# Patient Record
Sex: MALE | Race: WHITE
[De-identification: names, ages, dates, MRNs, and addresses within clinical notes are randomized per-mention and may not be internally consistent; named-entity substitution may affect disease eponyms.]

---

## 2017-07-31 ENCOUNTER — HOSPITAL ENCOUNTER (EMERGENCY)
Dept: HOSPITAL 45 - C.EDB | Age: 37
Discharge: HOME | End: 2017-07-31
Payer: COMMERCIAL

## 2017-07-31 VITALS — SYSTOLIC BLOOD PRESSURE: 145 MMHG | HEART RATE: 72 BPM | OXYGEN SATURATION: 98 % | DIASTOLIC BLOOD PRESSURE: 72 MMHG

## 2017-07-31 VITALS — TEMPERATURE: 98.78 F

## 2017-07-31 VITALS
BODY MASS INDEX: 40.04 KG/M2 | WEIGHT: 249.12 LBS | BODY MASS INDEX: 40.04 KG/M2 | HEIGHT: 65.98 IN | HEIGHT: 65.98 IN | WEIGHT: 249.12 LBS

## 2017-07-31 VITALS — OXYGEN SATURATION: 96 %

## 2017-07-31 DIAGNOSIS — F41.9: Primary | ICD-10-CM

## 2017-07-31 DIAGNOSIS — H81.09: ICD-10-CM

## 2017-07-31 DIAGNOSIS — F17.200: ICD-10-CM

## 2017-07-31 DIAGNOSIS — R42: ICD-10-CM

## 2017-07-31 DIAGNOSIS — F32.9: ICD-10-CM

## 2017-07-31 LAB
ALBUMIN/GLOB SERPL: 1.2 {RATIO} (ref 0.9–2)
ALP SERPL-CCNC: 51 U/L (ref 45–117)
ALT SERPL-CCNC: 29 U/L (ref 12–78)
ANION GAP SERPL CALC-SCNC: 6 MMOL/L (ref 3–11)
APAP SERPL-MCNC: < 2 UG/ML (ref 10–30)
APPEARANCE UR: CLEAR
AST SERPL-CCNC: 7 U/L (ref 15–37)
BASOPHILS # BLD: 0.03 K/UL (ref 0–0.2)
BASOPHILS NFR BLD: 0.4 %
BENZODIAZ UR-MCNC: (no result) UG/L
BILIRUB UR-MCNC: (no result) MG/DL
BUN SERPL-MCNC: 16 MG/DL (ref 7–18)
BUN/CREAT SERPL: 18.7 (ref 10–20)
CALCIUM SERPL-MCNC: 9 MG/DL (ref 8.5–10.1)
CHLORIDE SERPL-SCNC: 109 MMOL/L (ref 98–107)
CO2 SERPL-SCNC: 28 MMOL/L (ref 21–32)
COLOR UR: YELLOW
COMPLETE: YES
CREAT CL PREDICTED SERPL C-G-VRATE: 141.2 ML/MIN
CREAT SERPL-MCNC: 0.87 MG/DL (ref 0.6–1.4)
EOSINOPHIL NFR BLD AUTO: 204 K/UL (ref 130–400)
GLOBULIN SER-MCNC: 3.3 GM/DL (ref 2.5–4)
GLUCOSE SERPL-MCNC: 99 MG/DL (ref 70–99)
HCT VFR BLD CALC: 44.1 % (ref 42–52)
IG%: 0.3 %
IMM GRANULOCYTES NFR BLD AUTO: 25.3 %
LYMPHOCYTES # BLD: 1.76 K/UL (ref 1.2–3.4)
MANUAL MICROSCOPIC REQUIRED?: NO
MCH RBC QN AUTO: 29.2 PG (ref 25–34)
MCHC RBC AUTO-ENTMCNC: 34.2 G/DL (ref 32–36)
MCV RBC AUTO: 85.1 FL (ref 80–100)
MONOCYTES NFR BLD: 6.7 %
NEUTROPHILS # BLD AUTO: 1.9 %
NEUTROPHILS NFR BLD AUTO: 65.4 %
NITRITE UR QL STRIP: (no result)
PCP UR-MCNC: (no result) UG/L
PH UR STRIP: 7 [PH] (ref 4.5–7.5)
PMV BLD AUTO: 10.7 FL (ref 7.4–10.4)
POTASSIUM SERPL-SCNC: 4 MMOL/L (ref 3.5–5.1)
RBC # BLD AUTO: 5.18 M/UL (ref 4.7–6.1)
REVIEW REQ?: NO
SODIUM SERPL-SCNC: 143 MMOL/L (ref 136–145)
SP GR UR STRIP: 1.02 (ref 1–1.03)
TSH SERPL-ACNC: 0.52 UIU/ML (ref 0.3–4.5)
URINE BILL WITH OR WITHOUT MIC: (no result)
URINE EPITHELIAL CELL AUTO: (no result) /LPF (ref 0–5)
UROBILINOGEN UR-MCNC: (no result) MG/DL
WBC # BLD AUTO: 6.97 K/UL (ref 4.8–10.8)

## 2017-07-31 NOTE — EMERGENCY ROOM VISIT NOTE
History


First contact with patient:  13:32


Chief Complaint:  DIZZY


Stated Complaint:  DIZZY


Nursing Triage Summary:  


hx right vestibular nerve section and a shunt and vertigo





pt c/o dizziness today





History of Present Illness


Patient is a 36-year-old white male with past medical history significant for M

nire's disease, neurofibromatosis, chronic vertigo, status post resection of 

his right vestibular nerve, who is brought to the emergency department today by 

a coworker for evaluation of spells of vertigo today.  Patient reports that he 

has been under increased stress over the last week due to the death of a friend 

from overdose.  The friend's sister also apparently tried to harm herself.  He 

has been trying to be supportive for her, and has had difficulty dealing with 

the sudden death of his friend.  He also reports that he is having difficulty 

coping with his chronic medical condition.  He has good and bad days with 

regards to his dizziness and vertigo.  He is followed by neurologist from 

[place], and reports that he has been appointment in September.  He has 

symptoms on a daily basis, some days are better than others.  Today, he feels 

like they are worsened by his stress and anxiety.  He presently describes his 

symptoms as feeling like he is "on a Tilt a Whirl" with associated nausea.  He 

has not taken any medication for his symptoms today.  He reports "I feel like I'

m going insane."  He was apparently at work, where he states he had a 

"breakdown."  His coworker wanted to take him home, but the patient did not 

feel like he could go home, and tried to go to his [employer] to speak to his 

.  When the  was not available, the coworker suggested coming to 

the emergency department.  The patient states "I just want someone to talk to."

  The patient has a remote history of depression when he was roughly 16 after 

the sudden passing of his father.  He did see a therapist and was on 

antidepressants for some time.  He does endorse symptoms consistent with anxiety

, but is not presently on any medications for this.  He has a lot of issues 

coping with his chronic medical condition.  He denies feeling suicidal or 

homicidal at this time.





Review of Systems


Review of systems as per HPI.  All other systems reviewed were negative.  10 

systems reviewed.





Past Medical/Surgical History


Medical Problems:


(1) Anxiety


(2) Depressive Disorder Nec


(3) Fall


(4) Meniere disease


(5) Neurofibromatosis,Type 1 Von Recklinghausen's


(6) Tinnitus Nos


(7) Tinnitus Nos


(8) Tinnitus Nos


(9) Unspecified tinnitus


(10) Vertigo


(11) Vertigo


(12) Vertigo


Electronic medical records are reviewed and summarized as above/below.  See 

Problem List.





Social History


Smoking Status:  Current Every Day Smoker


Alcohol Use:  none


Drug Use:  none


Marital Status:  single


Housing Status:  lives with family


Occupation Status:  employed





Current/Historical Medications


Scheduled


Betahistine Hydrochloride (Bul (Betahistine Dihydrochlori), Unknown Dose PO AS 

DIRECTED





Allergies


Coded Allergies:  


     Dimenhydrinate (Verified  Allergy, Severe, VESTIBULAR SX, 8/12/16)


 INSTRUCTED NEVER TO TAKE


     Nabumetone (Verified  Allergy, Unknown, RASH, 8/12/16)





Physical Exam


Vital Signs











  Date Time  Temp Pulse Resp B/P (MAP) Pulse Ox O2 Delivery O2 Flow Rate FiO2


 


7/31/17 17:15  72 16 145/72 98 Room Air  


 


7/31/17 15:17  76  153/89 98 Room Air  





  81  142/88    





  85  155/94    


 


7/31/17 15:17  76 16 153/89 98 Room Air  


 


7/31/17 13:38  92      


 


7/31/17 13:34     96 Room Air  


 


7/31/17 13:11 37.1 93 16 153/85 96 Room Air  











Physical Exam


CONSTITUTIONAL: Patient is a well-appearing 36-year-old white male who is awake 

and alert and in no acute distress.  His vital signs are stable.


HEENT: Normocephalic, atraumatic.  Pupils equal, round, reactive to light and 

accommodation.  EOMs intact without nystagmus.  Sclera are anicteric. Tympanic 

membranes intact, with normal landmarks.  External canals are clear.  No 

hemotympanum or Medina sign.  Oral and nasopharynx are clear.  No CSF 

rhinorrhea. Mucous membranes are moist. 


NECK: Supple, nontender, no lymphadenopathy.  Full range of motion.


HEART: Regular rate and rhythm, with normal S1 and S2, no murmur or gallop or 

rub is heard.


LUNGS: Breath sounds equal and clear to auscultation without wheezes, rales, or 

rhonchi heard. 


ABDOMEN: Bowel sounds are present.  Abdomen is soft, nontender and 

nondistended.  


SKIN: No lesions or rash, normal skin turgor.


EXTREMITIES: No cyanosis, edema, joint tenderness or swelling.  No deformity.


NEUROLOGICAL: Alert and oriented x4.  Cranial nerves 2 through 12, sensation 

and strength grossly intact.  Gait is normal. Finger to nose is intact.  

Immediate, recent and remote memories are intact.  Concentration is normal.  


PSYCHIATRIC: Patient is alert and oriented.  Flat affect, voice is muted and 

mumbles at times.  Eye contact is appropriate.  Answers questions 

appropriately.  No homicidal or suicidal thoughts or ideation.





Medical Decision & Procedures


Laboratory Results


7/31/17 15:00








Red Blood Count 5.18, Mean Corpuscular Volume 85.1, Mean Corpuscular Hemoglobin 

29.2, Mean Corpuscular Hemoglobin Concent 34.2, Mean Platelet Volume 10.7, 

Neutrophils (%) (Auto) 65.4, Lymphocytes (%) (Auto) 25.3, Monocytes (%) (Auto) 

6.7, Eosinophils (%) (Auto) 1.9, Basophils (%) (Auto) 0.4, Neutrophils # (Auto) 

4.56, Lymphocytes # (Auto) 1.76, Monocytes # (Auto) 0.47, Eosinophils # (Auto) 

0.13, Basophils # (Auto) 0.03





7/31/17 15:00

















Test


  7/31/17


14:30 7/31/17


15:00 7/31/17


15:20


 


Urine Color YELLOW   


 


Urine Appearance CLEAR (CLEAR)   


 


Urine pH 7.0 (4.5-7.5)   


 


Urine Specific Gravity


  1.022


(1.000-1.030) 


  


 


 


Urine Protein 1+ (NEG)   


 


Urine Glucose (UA) NEG (NEG)   


 


Urine Ketones TRACE (NEG)   


 


Urine Occult Blood TRACE (NEG)   


 


Urine Nitrite NEG (NEG)   


 


Urine Bilirubin NEG (NEG)   


 


Urine Urobilinogen NEG (NEG)   


 


Urine Leukocyte Esterase NEG (NEG)   


 


Urine WBC (Auto) 0 /hpf (0-5)   


 


Urine RBC (Auto)


  5-10 /hpf


(0-4) 


  


 


 


Urine Hyaline Casts (Auto) 1-5 /lpf (0-5)   


 


Urine Epithelial Cells (Auto) 0-5 /lpf (0-5)   


 


Urine Bacteria (Auto) NEG (NEG)   


 


Urine Opiates Screen NEG (NEG)   


 


Urine Methadone, Qualitative NEG (NEG)   


 


Urine Barbiturates NEG (NEG)   


 


Urine Phencyclidine (PCP)


Level NEG (NEG) 


  


  


 


 


Ur


Amphetamine/Methamphetamine NEG (NEG) 


  


  


 


 


MDMA (Ecstasy) Screen NEG (NEG)   


 


Urine Benzodiazepines Screen NEG (NEG)   


 


Urine Cocaine Metabolite NEG (NEG)   


 


Urine Marijuana (THC) POS (NEG)   


 


White Blood Count


  


  6.97 K/uL


(4.8-10.8) 


 


 


Red Blood Count


  


  5.18 M/uL


(4.7-6.1) 


 


 


Hemoglobin


  


  15.1 g/dL


(14.0-18.0) 


 


 


Hematocrit  44.1 % (42-52)  


 


Mean Corpuscular Volume


  


  85.1 fL


() 


 


 


Mean Corpuscular Hemoglobin


  


  29.2 pg


(25-34) 


 


 


Mean Corpuscular Hemoglobin


Concent 


  34.2 g/dl


(32-36) 


 


 


Platelet Count


  


  204 K/uL


(130-400) 


 


 


Mean Platelet Volume


  


  10.7 fL


(7.4-10.4) 


 


 


Neutrophils (%) (Auto)  65.4 %  


 


Lymphocytes (%) (Auto)  25.3 %  


 


Monocytes (%) (Auto)  6.7 %  


 


Eosinophils (%) (Auto)  1.9 %  


 


Basophils (%) (Auto)  0.4 %  


 


Neutrophils # (Auto)


  


  4.56 K/uL


(1.4-6.5) 


 


 


Lymphocytes # (Auto)


  


  1.76 K/uL


(1.2-3.4) 


 


 


Monocytes # (Auto)


  


  0.47 K/uL


(0.11-0.59) 


 


 


Eosinophils # (Auto)


  


  0.13 K/uL


(0-0.5) 


 


 


Basophils # (Auto)


  


  0.03 K/uL


(0-0.2) 


 


 


RDW Standard Deviation


  


  39.5 fL


(36.4-46.3) 


 


 


RDW Coefficient of Variation


  


  12.7 %


(11.5-14.5) 


 


 


Immature Granulocyte % (Auto)  0.3 %  


 


Immature Granulocyte # (Auto)


  


  0.02 K/uL


(0.00-0.02) 


 


 


Anion Gap


  


  6.0 mmol/L


(3-11) 


 


 


Est Creatinine Clear Calc


Drug Dose 


  141.2 ml/min 


  


 


 


Estimated GFR (


American) 


  130.5 


  


 


 


Estimated GFR (Non-


American 


  112.6 


  


 


 


BUN/Creatinine Ratio  18.7 (10-20)  


 


Calcium Level


  


  9.0 mg/dl


(8.5-10.1) 


 


 


Total Bilirubin


  


  0.3 mg/dl


(0.2-1) 


 


 


Aspartate Amino Transf


(AST/SGOT) 


  7 U/L (15-37) 


  


 


 


Alanine Aminotransferase


(ALT/SGPT) 


  29 U/L (12-78) 


  


 


 


Alkaline Phosphatase


  


  51 U/L


() 


 


 


Troponin I


  


  < 0.015 ng/ml


(0-0.045) 


 


 


Total Protein


  


  7.1 gm/dl


(6.4-8.2) 


 


 


Albumin


  


  3.8 gm/dl


(3.4-5.0) 


 


 


Globulin


  


  3.3 gm/dl


(2.5-4.0) 


 


 


Albumin/Globulin Ratio  1.2 (0.9-2)  


 


Thyroid Stimulating Hormone


(TSH) 


  0.517 uIu/ml


(0.300-4.500) 


 


 


Salicylates Level


  


  3.8 mg/dl


(2.8-20) 


 


 


Acetaminophen Level


  


  < 2 ug/ml


(10-30) 


 


 


Ethyl Alcohol mg/dL


  


  


  < 3.0 mg/dl


(0-3)











Medications Administered











 Medications


  (Trade)  Dose


 Ordered  Sig/Zaria


 Route  Start Time


 Stop Time Status Last Admin


Dose Admin


 


 Sodium Chloride  1,000 ml @ 


 999 mls/hr  Q1H1M STAT


 IV  7/31/17 14:05


 7/31/17 15:05 DC 7/31/17 14:05


999 MLS/HR


 


 Ondansetron HCl


  (Zofran Inj)  4 mg  NOW  STAT


 IV  7/31/17 14:05


 7/31/17 14:07 DC 7/31/17 14:05


4 MG


 


 Meclizine HCl


  (Antivert Tab)  25 mg  NOW  STAT


 PO  7/31/17 14:05


 7/31/17 14:07 DC 7/31/17 14:42


25 MG











ECG


Indication:  other (dizziness)


Rate (beats per minute):  85


Rhythm:  normal sinus


Findings:  no acute ischemic change, no ectopy


Change:  no significant change





ED Course


The patient was seen and assessed as above.  His old records are reviewed.  He 

presents the emergency department for evaluation of dizziness, but upon further 

questioning, the patient has been under increased stress and anxiety due to 

both his chronic medical condition, as well as unexpected passing of a friend, 

and in talking this over with the patient, he would like to speak with someone 

from mental health.  The dizziness was a secondary concern for him, as he 

states that this is typical of his chronic condition, and is no worse than his 

baseline.  He feels that the dizziness has been exacerbated by his stress and 

anxiety however.  EKG was performed and was as noted above.  IV lock was 

initiated and the patient was placed on a cardiac monitor.  He was hydrated 

with normal saline solution, and was medicated with Zofran 4 mg IV and 

meclizine 25 mg orally.  He reported feeling symptomatically dizzy while laying 

in the bed, no worse than his normal, and his neurologic exam is completely 

benign.  Laboratory studies were collected primarily for psychiatric clearance, 

but also to assess or his dizziness.





The patient's emergency department workup was largely unrevealing.  White count 

is not elevated.  He is not anemic.  There are no electrolyte or renal function 

abnormalities noted.  Liver functions are normal.  Cardiac enzymes and thyroid 

function studies are within normal limits.  Urinalysis noted trace ketones and 

trace blood.  Urine toxicology screen was positive for marijuana, which the 

patient did not admit to.  Alcohol, salicylates and acetaminophen levels were 

undetectable.





Patient was discussed and evaluated by Kishor Triplett, ED Psychiatric Case 

Manager.  Please refer to his assessment for further information.  Patient was 

reviewed with Kishor after his assessment, and it was not felt that he was a 

threat to himself or others, and did not feel that inpatient psychiatric care 

was warranted, however referral to outpatient services was provided and the 

patient was in agreement.  The patient was encouraged to seek further 

psychiatric care as an outpatient, and was educated on the worrisome signs or 

symptoms for which he should return to the emergency department.  With regards 

to his dizziness, he was advised to keep the appointment he has scheduled with 

his neurologist for September.  Certainly he can reach out to their office 

sooner if he is having changes in his current condition.  He expressed 

understanding of this and was agreeable.  He was discharged home with his 

mother driving.





Differential diagnoses entertained included BPV, dehydration, anemia, mass or 

malignancy, infection, electrolyte or metabolic abnormality, cardiac arrhythmia

, toxicologic, cardiac or neurologic etiology, depression, anxiety, substance 

abuse, among others.





Blood pressure screening: Patient was found to have a slightly elevated blood 

pressure due to circumstances.  I do not believe that the patient requires 

hypertension monitoring.





Medication reconciliation: I attest that I have personally reviewed the patient'

s current medication list.





Medical Decision


See Emergency Department course





Impression





 Primary Impression:  


 Anxiety


 Additional Impression:  


 Dizziness





Departure Information


Referrals


Jc Clarke III, M.D. (PCP)





Patient Instructions


My [facility]





Additional Instructions





DO NOT drive, drink alcohol, operate machinery, or perform dangerous activities 

today.  You 


were given medications in the ER that can affect your ability to safely 

function or operate 


a vehicle.





Continue your current medications.





Return to the ER for severe anxiety or depression, thoughts of hurting yourself 

or others, 


inability to function, hallucinations, worsening of your condition, or as 

needed.





Follow up with outpatient services as discussed.





Follow up with your primary care physician this week for a recheck of your 

current condition 


and continued care.





Follow-up with your specialist in [place] as you have scheduled.





Problem Qualifiers

## 2017-08-01 ENCOUNTER — HOSPITAL ENCOUNTER (EMERGENCY)
Dept: HOSPITAL 45 - C.EDB | Age: 37
Discharge: HOME | End: 2017-08-01
Payer: COMMERCIAL

## 2017-08-01 VITALS — TEMPERATURE: 98.24 F

## 2017-08-01 VITALS
HEIGHT: 65.98 IN | BODY MASS INDEX: 42.52 KG/M2 | BODY MASS INDEX: 42.52 KG/M2 | HEIGHT: 65.98 IN | WEIGHT: 264.55 LBS | WEIGHT: 264.55 LBS

## 2017-08-01 VITALS — OXYGEN SATURATION: 98 % | SYSTOLIC BLOOD PRESSURE: 127 MMHG | DIASTOLIC BLOOD PRESSURE: 79 MMHG | HEART RATE: 72 BPM

## 2017-08-01 VITALS — OXYGEN SATURATION: 97 %

## 2017-08-01 DIAGNOSIS — F17.210: ICD-10-CM

## 2017-08-01 DIAGNOSIS — R42: Primary | ICD-10-CM

## 2017-08-01 DIAGNOSIS — F32.9: ICD-10-CM

## 2017-08-01 DIAGNOSIS — Q85.01: ICD-10-CM

## 2017-08-01 DIAGNOSIS — F41.9: ICD-10-CM

## 2017-08-01 LAB
ALP SERPL-CCNC: 51 U/L (ref 45–117)
ALT SERPL-CCNC: 29 U/L (ref 12–78)
ANION GAP SERPL CALC-SCNC: 5 MMOL/L (ref 3–11)
APPEARANCE UR: CLEAR
AST SERPL-CCNC: 10 U/L (ref 15–37)
BASOPHILS # BLD: 0.02 K/UL (ref 0–0.2)
BASOPHILS NFR BLD: 0.2 %
BILIRUB UR-MCNC: (no result) MG/DL
BUN SERPL-MCNC: 14 MG/DL (ref 7–18)
BUN/CREAT SERPL: 17.5 (ref 10–20)
CALCIUM SERPL-MCNC: 9.2 MG/DL (ref 8.5–10.1)
CHLORIDE SERPL-SCNC: 109 MMOL/L (ref 98–107)
CKMB/CK RATIO: 1.3 (ref 0–3)
CO2 SERPL-SCNC: 27 MMOL/L (ref 21–32)
COLOR UR: YELLOW
COMPLETE: YES
CREAT CL PREDICTED SERPL C-G-VRATE: 154.9 ML/MIN
CREAT SERPL-MCNC: 0.82 MG/DL (ref 0.6–1.4)
EOSINOPHIL NFR BLD AUTO: 225 K/UL (ref 130–400)
GLUCOSE SERPL-MCNC: 90 MG/DL (ref 70–99)
HCT VFR BLD CALC: 44.9 % (ref 42–52)
IG%: 0.4 %
IMM GRANULOCYTES NFR BLD AUTO: 26.2 %
INR PPP: 1 (ref 0.9–1.1)
LYMPHOCYTES # BLD: 2.17 K/UL (ref 1.2–3.4)
MAGNESIUM SERPL-MCNC: 2.4 MG/DL (ref 1.8–2.4)
MANUAL MICROSCOPIC REQUIRED?: NO
MCH RBC QN AUTO: 28.9 PG (ref 25–34)
MCHC RBC AUTO-ENTMCNC: 34.1 G/DL (ref 32–36)
MCV RBC AUTO: 84.7 FL (ref 80–100)
MONOCYTES NFR BLD: 8 %
NEUTROPHILS # BLD AUTO: 1.3 %
NEUTROPHILS NFR BLD AUTO: 63.9 %
NITRITE UR QL STRIP: (no result)
PARTIAL THROMBOPLASTIN RATIO: 1.1
PH UR STRIP: 7.5 [PH] (ref 4.5–7.5)
PMV BLD AUTO: 10.5 FL (ref 7.4–10.4)
POTASSIUM SERPL-SCNC: 4.1 MMOL/L (ref 3.5–5.1)
PROTHROMBIN TIME: 10.6 SECONDS (ref 9–12)
RBC # BLD AUTO: 5.3 M/UL (ref 4.7–6.1)
REVIEW REQ?: NO
SODIUM SERPL-SCNC: 141 MMOL/L (ref 136–145)
SP GR UR STRIP: 1.01 (ref 1–1.03)
TSH SERPL-ACNC: 0.47 UIU/ML (ref 0.3–4.5)
URINE BILL WITH OR WITHOUT MIC: (no result)
URINE EPITHELIAL CELL AUTO: (no result) /LPF (ref 0–5)
UROBILINOGEN UR-MCNC: (no result) MG/DL
WBC # BLD AUTO: 8.29 K/UL (ref 4.8–10.8)

## 2017-08-01 NOTE — EMERGENCY ROOM VISIT NOTE
History


Report prepared by Patti:  Willem Martínez


Under the Supervision of:  Dr. Jc Ramesh M.D.


First contact with patient:  14:50


Chief Complaint:  DIZZY


Stated Complaint:  SEEN AT [facility] T-1, DIZZY, ROOM SPINNING


Nursing Triage Summary:  


triage note:





pt to triage via wheelchair.





"i am just really dizzy and i have been falling."





History of Present Illness


The patient is a 36 year old male who presents to the Emergency Room with 

complaints of dizziness that occurred today, a couple of hours ago. He was seen 

in the ER yesterday for the same symptoms. However today, his symptoms have 

worsened again. Today, his dizziness has actually made him fall as well. He did 

not injure anything, however. He is experiencing nausea currently. He suffers 

from chronic vertigo and balance problems. He has received surgeries to try to 

resolve these, but they only offered some relief. Moving his eyes side to side 

make his symptoms worse. He currently has Meclizine and an antihistamine at home

, but states they did not help. Pt denies LOC, headache, fevers, chills, 

diaphoresis, visual changes, neck pain, chest pain, breathing difficulties, 

vomiting, abdominal pain, back pain, melena, hematochezia, urinary symptoms, 

numbness, weakness, lymphadenopathy, rash, or other complaints. He also notes 

that he has been recently stressed out because his friend passed away.





   Source of History:  patient


   Onset:  today


   Position:  other (global)


   Symptom Intensity:  moderate


   Quality:  other (Dizziness)


   Timing:  constant


   Modifying Factors (Worsening):  movement (Eyes)


   Associated Symptoms:  + nausea





Review of Systems


See HPI for pertinent positives and negatives.  A total of ten systems were 

reviewed and were otherwise negative.





Past Medical & Surgical


Medical Problems:


(1) Anxiety


(2) Depressive Disorder Nec


(3) Fall


(4) Meniere disease


(5) Neurofibromatosis,Type 1 Von Recklinghausen's


(6) Tinnitus Nos


(7) Tinnitus Nos


(8) Tinnitus Nos


(9) Unspecified tinnitus


(10) Vertigo


(11) Vertigo


(12) Vertigo








Family History





Patient reports no known family medical history.





Social History


Smoking Status:  Current Every Day Smoker


Alcohol Use:  none


Drug Use:  none


Marital Status:  single


Housing Status:  lives with family


Occupation Status:  employed





Current/Historical Medications


Scheduled


Betahistine Hydrochloride (Bul (Betahistine Dihydrochlori), Unknown Dose PO AS 

DIRECTED





Scheduled PRN


Lorazepam (Ativan), 1 MG PO Q6H PRN for Dizziness or Vertigo





Allergies


Coded Allergies:  


     Dimenhydrinate (Verified  Allergy, Severe, VESTIBULAR SX, 8/1/17)


 INSTRUCTED NEVER TO TAKE


     Nabumetone (Verified  Allergy, Unknown, RASH, 8/1/17)





Physical Exam


Vital Signs











  Date Time  Temp Pulse Resp B/P (MAP) Pulse Ox O2 Delivery O2 Flow Rate FiO2


 


8/1/17 18:37  72 18 127/79 98 Room Air  


 


8/1/17 17:14  78 18 159/90 98 Room Air  


 


8/1/17 15:56  71 18 129/88 99 Room Air  


 


8/1/17 15:06  78      


 


8/1/17 15:03     97 Room Air  


 


8/1/17 14:58  83  150/79    





  93  135/74    





  81  124/76    


 


8/1/17 14:17 36.8 85 18 138/81 96 Room Air  











Physical Exam


GENERAL: Awake, alert, well appearing, no distress


HENT: Normocephalic, atraumatic.  TM's normal.  Oropharynx unremarkable.


EYES: PERRL. EOMI.  Lateral nystagmus, no pathological nystagmus. Normal 

conjunctiva. Sclera non-icteric.


NECK: Supple. No nuchal rigidity.  FROM.  No JVD or bruit.


RESPIRATORY:  CTA


CARDIAC: RRR.  No murmur. 


ABDOMEN: Soft, non distended.  No tenderness to palpation.  No rebound or 

guarding.  No masses.


RECTAL: Deferred.


MUSCULOSKELETAL:  Unremarkable.  No edema.  No discoloration.  Gross motor 

strength symmetric.  


NEURO: Cranial nerves 2-12 grossly intact.   Normal sensorium.  No sensory or 

motor deficits noted.  Unsteady gait normal.  Speech normal.  No pronator drift.


SKIN: No rash or jaundice noted.


LYMPH: No adenopathy.





Medical Decision & Procedures


Laboratory Results


8/1/17 15:30








Red Blood Count 5.30, Mean Corpuscular Volume 84.7, Mean Corpuscular Hemoglobin 

28.9, Mean Corpuscular Hemoglobin Concent 34.1, Mean Platelet Volume 10.5, 

Neutrophils (%) (Auto) 63.9, Lymphocytes (%) (Auto) 26.2, Monocytes (%) (Auto) 

8.0, Eosinophils (%) (Auto) 1.3, Basophils (%) (Auto) 0.2, Neutrophils # (Auto) 

5.30, Lymphocytes # (Auto) 2.17, Monocytes # (Auto) 0.66, Eosinophils # (Auto) 

0.11, Basophils # (Auto) 0.02





8/1/17 15:30

















Test


  8/1/17


15:30 8/1/17


17:10


 


White Blood Count


  8.29 K/uL


(4.8-10.8) 


 


 


Red Blood Count


  5.30 M/uL


(4.7-6.1) 


 


 


Hemoglobin


  15.3 g/dL


(14.0-18.0) 


 


 


Hematocrit 44.9 % (42-52)  


 


Mean Corpuscular Volume


  84.7 fL


() 


 


 


Mean Corpuscular Hemoglobin


  28.9 pg


(25-34) 


 


 


Mean Corpuscular Hemoglobin


Concent 34.1 g/dl


(32-36) 


 


 


Platelet Count


  225 K/uL


(130-400) 


 


 


Mean Platelet Volume


  10.5 fL


(7.4-10.4) 


 


 


Neutrophils (%) (Auto) 63.9 %  


 


Lymphocytes (%) (Auto) 26.2 %  


 


Monocytes (%) (Auto) 8.0 %  


 


Eosinophils (%) (Auto) 1.3 %  


 


Basophils (%) (Auto) 0.2 %  


 


Neutrophils # (Auto)


  5.30 K/uL


(1.4-6.5) 


 


 


Lymphocytes # (Auto)


  2.17 K/uL


(1.2-3.4) 


 


 


Monocytes # (Auto)


  0.66 K/uL


(0.11-0.59) 


 


 


Eosinophils # (Auto)


  0.11 K/uL


(0-0.5) 


 


 


Basophils # (Auto)


  0.02 K/uL


(0-0.2) 


 


 


RDW Standard Deviation


  38.6 fL


(36.4-46.3) 


 


 


RDW Coefficient of Variation


  12.6 %


(11.5-14.5) 


 


 


Immature Granulocyte % (Auto) 0.4 %  


 


Immature Granulocyte # (Auto)


  0.03 K/uL


(0.00-0.02) 


 


 


Prothrombin Time


  10.6 SECONDS


(9.0-12.0) 


 


 


Prothromb Time International


Ratio 1.0 (0.9-1.1) 


  


 


 


Activated Partial


Thromboplast Time 27.7 SECONDS


(21.0-31.0) 


 


 


Partial Thromboplastin Ratio 1.1  


 


Anion Gap


  5.0 mmol/L


(3-11) 


 


 


Est Creatinine Clear Calc


Drug Dose 154.9 ml/min 


  


 


 


Estimated GFR (


American) 133.7 


  


 


 


Estimated GFR (Non-


American 115.4 


  


 


 


BUN/Creatinine Ratio 17.5 (10-20)  


 


Calcium Level


  9.2 mg/dl


(8.5-10.1) 


 


 


Magnesium Level


  2.4 mg/dl


(1.8-2.4) 


 


 


Total Bilirubin


  0.4 mg/dl


(0.2-1) 


 


 


Direct Bilirubin


  0.1 mg/dl


(0-0.2) 


 


 


Aspartate Amino Transf


(AST/SGOT) 10 U/L (15-37) 


  


 


 


Alanine Aminotransferase


(ALT/SGPT) 29 U/L (12-78) 


  


 


 


Alkaline Phosphatase


  51 U/L


() 


 


 


Total Creatine Kinase


  48 U/L


() 


 


 


Creatine Kinase MB


  0.6 ng/ml


(0.5-3.6) 


 


 


Creatine Kinase MB Ratio 1.3 (0-3.0)  


 


Troponin I


  < 0.015 ng/ml


(0-0.045) 


 


 


Total Protein


  7.1 gm/dl


(6.4-8.2) 


 


 


Albumin


  3.7 gm/dl


(3.4-5.0) 


 


 


Lipase


  70 U/L


() 


 


 


Thyroid Stimulating Hormone


(TSH) 0.473 uIu/ml


(0.300-4.500) 


 


 


Urine Color  YELLOW 


 


Urine Appearance  CLEAR (CLEAR) 


 


Urine pH  7.5 (4.5-7.5) 


 


Urine Specific Gravity


  


  1.011


(1.000-1.030)


 


Urine Protein  NEG (NEG) 


 


Urine Glucose (UA)  NEG (NEG) 


 


Urine Ketones  NEG (NEG) 


 


Urine Occult Blood  TRACE (NEG) 


 


Urine Nitrite  NEG (NEG) 


 


Urine Bilirubin  NEG (NEG) 


 


Urine Urobilinogen  NEG (NEG) 


 


Urine Leukocyte Esterase  NEG (NEG) 


 


Urine WBC (Auto)  0 /hpf (0-5) 


 


Urine RBC (Auto)  0-4 /hpf (0-4) 


 


Urine Hyaline Casts (Auto)  0 /lpf (0-5) 


 


Urine Epithelial Cells (Auto)  0-5 /lpf (0-5) 


 


Urine Bacteria (Auto)  NEG (NEG) 





Laboratory results reviewed by me





Medications Administered











 Medications


  (Trade)  Dose


 Ordered  Sig/Zaria


 Route  Start Time


 Stop Time Status Last Admin


Dose Admin


 


 Sodium Chloride  1,000 ml @ 


 999 mls/hr  Q1H1M STAT


 IV  8/1/17 14:58


 8/1/17 15:58 DC 8/1/17 15:58


999 MLS/HR


 


 Ondansetron HCl


  (Zofran 8mg Iv)  8 mg  NOW  STAT


 IV  8/1/17 15:10


 8/1/17 15:11 DC 8/1/17 15:57


8 MG


 


 Diazepam


  (Valium Inj)  5 mg  NOW  STAT


 IV  8/1/17 15:10


 8/1/17 15:11 DC 8/1/17 15:57


5 MG


 


 Lorazepam


  (Ativan 1MG Home


 Pack)  1 homepack  UD  ONCE


 PO  8/1/17 18:45


 8/1/17 18:46 DC 8/1/17 18:37


1 HOMEPACK











ECG


Indication:  other (Dizziness)


Rate (beats per minute):  77


Rhythm:  normal sinus


Findings:  no acute ischemic change, no ectopy





ED Course


1450: The patient was evaluated in room C10. A complete history and physical 

exam was performed.





1458: Ordered Sodium Chloride 1000 ml @ 999 mls/hr IV





1500: The patient requested to talk to psychiatrics. 





1510: Ordered Valium Inj 5 mg IV, Ondansetron HCl 8 mg IV





1810: The psychiatric  is speaking with him now. 





1820: Per , he denies any suicidal or homicidal ideation. He is 

just very anxious. We will be giving him Ativan and instructions to follow up 

with his PCP. The hospitalist with message his PCP to get him an appointment 

this week.





1845: Ordered Lorazepam 1 homepack PO





1900: I reevaluated the patient. Discussed results and discharge instructions: 

He verbalized understanding and agreement. The patient is ready for discharge.





Medical Decision


Triage Nursing notes reviewed.


The patient's presentation and history were concerning for dizziness and 

frustration with his chronic medical conditions.





Etiologies such as chronic vertigo, Mnire's disease, mood disorder, benign 

positional vertigo, tumor, infection, hypoglycemia, electrolyte abnormalities, 

cardiac sources, intracerebral event, toxicologic, neurologic, as well as 

others were entertained.   





Patient was treated with Valium and Zofran.  The patient was hydrated.  Mental 

Health  notified.  The patient was feeling better on reassessment.  

He was not suicidal or homicidal.  He did not want to stay in the hospital 

regarding his emotional stress.  He did ask for further direction regarding his 

ongoing problem.  He has an appointment in September with his surgeon.  I told 

him to keep this but also it would not be unreasonable to have a second opinion 

since he is very unsure what to do.  Because of this the patient will need to 

follow-up closely with his family physician.  [facility] Sabrina Dominique PA-C to staff message his primary physician 

Dr. Jc Clarke so that he is aware and can see the patient expeditiously.  The 

patient will be given a small amount of Ativan which should help with his 

vertigo as well as his anxiety.  If he worsens in any way she will come back to 

the emergency department.  Patient felt comfortable with this plan.I gave my 

usual and customary discussion regarding this issue.





By the evaluation outlined above other emergent etiologies such as those listed 

in the differential, as well as others, were deemed relatively unlikely.  





The patient was educated about the findings as listed above.  All questions 

were answered and the patient was pleased with the treatment.  Return 

instructions were outlined and the patient was discharged in stable condition.  





The patient was referred to his PCP for follow-up for a recheck of the current 

condition.





Medication Reconciliation: I attest that I have personally reviewed the patient'

s current medication list





Blood pressure screening: Patient was found to have normal blood pressure on 

screening and does not require follow-up.





Impression





 Primary Impression:  


 Vertigo


 Additional Impressions:  


 Dizziness


 Anxiety





Scribe Attestation


The scribe's documentation has been prepared under my direction and personally 

reviewed by me in its entirety. I confirm that the note above accurately 

reflects all work, treatment, procedures, and medical decision making performed 

by me.





Departure Information


Dispostion


Home / Self-Care





Prescriptions





Lorazepam (ATIVAN) 1 Mg Tab


1 MG PO Q6H Y for Dizziness or Vertigo, #10 TAB


   Prov: Jc Ramesh MD         8/1/17





Referrals


Jc Clarke III, M.D. (PCP)





Forms


HOME CARE DOCUMENTATION FORM,                                                 

               IMPORTANT VISIT INFORMATION





Patient Instructions


[facility]





Additional Instructions





DIZZINESS INSTRUCTIONS:





DO NOT drive, drink alcohol, operate machinery, or perform dangerous activities 

today.  You were given medications in the ER that can affect your ability to 

safely function or operate a vehicle.  You should not drive or perform any 

dangerous activities until your symptoms resolve.





Ativan 1mg: Take 1 pill every 6 hours as needed for dizziness or vertigo.  

Avoid alcohol, operating machinery or dangerous equipment, working on ladders 

or roofs, DRIVING, or situations where being under the influence may be 

dangerous





Rest and drink plenty of fluids as tolerated.  





Continue current medications.





Return to the ER immediately for worsening or persistent dizziness, vomiting, 

severe anxiety, thoughts of self-harm, headache, fevers, chest pains, 

difficulty breathing, black or bloody stools, slurred speech, numbness, weakness

, visual changes, worsening of your condition, or as needed.





Follow up with your primary physician tomorrow for a recheck of your current 

condition.





Problem Qualifiers

## 2018-04-01 ENCOUNTER — HOSPITAL ENCOUNTER (EMERGENCY)
Dept: HOSPITAL 45 - C.EDA | Age: 38
Discharge: PSYCHIATRIC OR SUBSTANCE USE FACILITY | End: 2018-04-01
Payer: SELF-PAY

## 2018-04-01 VITALS
HEIGHT: 67.01 IN | HEIGHT: 67.01 IN | WEIGHT: 226.86 LBS | WEIGHT: 226.86 LBS | BODY MASS INDEX: 35.61 KG/M2 | BODY MASS INDEX: 35.61 KG/M2

## 2018-04-01 VITALS
DIASTOLIC BLOOD PRESSURE: 77 MMHG | SYSTOLIC BLOOD PRESSURE: 148 MMHG | OXYGEN SATURATION: 100 % | HEART RATE: 61 BPM | TEMPERATURE: 97.88 F

## 2018-04-01 DIAGNOSIS — F31.9: ICD-10-CM

## 2018-04-01 DIAGNOSIS — Q85.01: ICD-10-CM

## 2018-04-01 DIAGNOSIS — F41.9: ICD-10-CM

## 2018-04-01 DIAGNOSIS — G47.00: Primary | ICD-10-CM

## 2018-04-01 DIAGNOSIS — F33.0: ICD-10-CM

## 2018-04-01 DIAGNOSIS — Z88.8: ICD-10-CM

## 2018-04-01 DIAGNOSIS — F17.200: ICD-10-CM

## 2018-04-01 DIAGNOSIS — H81.09: ICD-10-CM

## 2018-04-01 LAB
ALBUMIN SERPL-MCNC: 4.2 GM/DL (ref 3.4–5)
ALP SERPL-CCNC: 52 U/L (ref 45–117)
ALT SERPL-CCNC: 82 U/L (ref 12–78)
AST SERPL-CCNC: 25 U/L (ref 15–37)
BUN SERPL-MCNC: 10 MG/DL (ref 7–18)
CALCIUM SERPL-MCNC: 9.4 MG/DL (ref 8.5–10.1)
CO2 SERPL-SCNC: 24 MMOL/L (ref 21–32)
CREAT SERPL-MCNC: 0.86 MG/DL (ref 0.6–1.4)
EOSINOPHIL NFR BLD AUTO: 226 K/UL (ref 130–400)
GLUCOSE SERPL-MCNC: 102 MG/DL (ref 70–99)
HCT VFR BLD CALC: 47.7 % (ref 42–52)
HGB BLD-MCNC: 17 G/DL (ref 14–18)
MCH RBC QN AUTO: 30.2 PG (ref 25–34)
MCHC RBC AUTO-ENTMCNC: 35.6 G/DL (ref 32–36)
MCV RBC AUTO: 84.9 FL (ref 80–100)
PMV BLD AUTO: 11.6 FL (ref 7.4–10.4)
POTASSIUM SERPL-SCNC: 3.8 MMOL/L (ref 3.5–5.1)
PROT SERPL-MCNC: 8 GM/DL (ref 6.4–8.2)
RED CELL DISTRIBUTION WIDTH CV: 12.4 % (ref 11.5–14.5)
RED CELL DISTRIBUTION WIDTH SD: 38 FL (ref 36.4–46.3)
SODIUM SERPL-SCNC: 137 MMOL/L (ref 136–145)
WBC # BLD AUTO: 8.67 K/UL (ref 4.8–10.8)

## 2018-04-01 NOTE — EMERGENCY ROOM VISIT NOTE
ED Visit Note


This patient was signed out to me by Dr. Friedman at shift change.  At that point, 

the patient had been medically cleared and was awaiting psychiatric evaluation.

  Apparently he has had insomnia and a host hallucinating with this.  He denies 

any suicidal or homicidal ideations.  He was evaluated by the mental health 

team they feel that he would benefit from inpatient treatment and evaluation.  

When I go to check on the patient he is awake and cooperative.  He is willing 

to come in voluntarily.  He has been accepted by the medicine transfer for 

voluntary admission.

## 2018-04-01 NOTE — EMERGENCY ROOM VISIT NOTE
History


Report prepared by Patti:  Nell Gibbons


Under the Supervision of:  Dr. Aby Friedman D.O.


First contact with patient:  05:12


Chief Complaint:  MENTAL HEALTH EVALUATION


Stated Complaint:  mental health assessment





History of Present Illness


The patient is a 37 year old male who presents to the Emergency Room for a 

mental health evaluation. The patient states that he has not slept for 4 days. 

He states that he does not know why, but that he wants to. He reports that he 

has had these sleeping issues for 6 months. He states that he is hallucinating 

because he hasn't slept. He notes he currently is and he is seeing objects on 

the walls. He notes that before coming in he saw people in the streets 

disappearing. He states that he has tried taking Melatonin and doing breathing 

activities to get to sleep. He states that he can fall asleep for about 20 

minutes, but then wakes back up. He states that he cannot stand living like 

this anymore and the days he stays awake are getting longer and longer. He 

reports that he called [facility] because of this. He states that he feels that 

his lack of sleep and Mnire's Disease has exacerbated his depression. The 

patient reports that he has seen a therapist and has been treated for depression

, anxiety, and bipolar disorder. He states that he hasn't seen a therapist for 

years. The patient complains of not being able to focus. The patient states 

that he quit drinking alcohol 2 weeks ago and had a small withdrawal when he 

did so. The patient notes the he is unemployed and lives with his mother. He 

notes his mother does not know he is here and would be mad if she knew. He 

states that he has been eating and drinking normally. The patient denies ever 

being a psychiatric inpatient before and hearing voices.





   Source of History:  patient


   Onset:  this morning


   Position:  other (global)


   Quality:  other (mental health)


   Timing:  other (episode)


Note:


The patient complains of insomnia, hallucinating, and not being able to focus. 

The patient denies hearing voices.





Review of Systems


See HPI for pertinent positives & negatives. A total of 10 systems reviewed and 

were otherwise negative.





Past Medical & Surgical


Medical Problems:


(1) Anxiety


(2) Depressive Disorder Nec


(3) Fall


(4) Meniere disease


(5) Meniere's disease


(6) Neurofibromatosis,Type 1 Von Recklinghausen's


(7) Tinnitus Nos


(8) Tinnitus Nos


(9) Tinnitus Nos


(10) Unspecified tinnitus


(11) Vertigo


(12) Vertigo


(13) Vertigo








Family History





Patient reports no known family medical history.





Social History


Smoking Status:  Current Every Day Smoker


Alcohol Use:  none


Drug Use:  none


Marital Status:  single


Housing Status:  lives with family


Occupation Status:  unemployed





Current/Historical Medications


Scheduled


Betahistine Hydrochloride (Bul (Betahistine Dihydrochlori), Unknown Dose PO AS 

DIRECTED





Allergies


Coded Allergies:  


     Dimenhydrinate (Verified  Allergy, Severe, VESTIBULAR SX, 8/1/17)


 INSTRUCTED NEVER TO TAKE


     Nabumetone (Verified  Allergy, Unknown, RASH, 8/1/17)





Physical Exam


Vital Signs











  Date Time  Temp Pulse Resp B/P (MAP) Pulse Ox O2 Delivery O2 Flow Rate FiO2


 


4/1/18 07:20  61 16 150/79 100 Room Air  


 


4/1/18 05:09  77 18 175/85 99 Room Air  











Physical Exam


HEENT: Head - normocephalic and atraumatic   Pupils are equal, round, and 

reactive to light.  Extraocular eye muscles are intact, and sclera are 

anicteric.   Nose -  moist nasal mucosa without discharge. Mouth - moist buccal 

mucosa.  Oropharynx is nonerythematous and there is no tonsillar exudate or 

edema noted.


Neck: Supple; no JVD, nuchal rigidity, cervical lymphadenopathy.


Heart: Regular rate and rhythm.  There is a normal S1 and S2 with no murmurs, 

clicks, or gallops appreciated.


Lungs: Clear to auscultation bilaterally with no wheezes, rales, or rhonchi.


Abdomen: Soft, completely nontender, nondistended, with good bowel sounds.  

There are no palpable pulsatile masses or hepatosplenomegaly.  There is no 

guarding, rigidity, or rebound noted.


Extremities: No evidence of cyanosis, clubbing, or edema.  There are easily 

palpable peripheral pulses.


Skin: warm and dry with good turgor and no rashes.


Psych: The patient mumbles. Admits to depression and frustration secondary to 

insomnia. Denies suicidal ideation, but admits to actively hallucinating from 

sleep deprivation.





Medical Decision & Procedures


Laboratory Results


4/1/18 05:14








4/1/18 05:14

















Test


  4/1/18


05:14 4/1/18


05:40


 


Red Blood Count


  5.62 M/uL


(4.7-6.1) 


 


 


Mean Corpuscular Volume


  84.9 fL


() 


 


 


Mean Corpuscular Hemoglobin


  30.2 pg


(25-34) 


 


 


Mean Corpuscular Hemoglobin


Concent 35.6 g/dl


(32-36) 


 


 


RDW Standard Deviation


  38.0 fL


(36.4-46.3) 


 


 


RDW Coefficient of Variation


  12.4 %


(11.5-14.5) 


 


 


Mean Platelet Volume


  11.6 fL


(7.4-10.4) 


 


 


Anion Gap


  8.0 mmol/L


(3-11) 


 


 


Est Creatinine Clear Calc


Drug Dose 137.1 ml/min 


  


 


 


Estimated GFR (


American) 130.2 


  


 


 


Estimated GFR (Non-


American 112.3 


  


 


 


BUN/Creatinine Ratio 11.4 (10-20)  


 


Calcium Level


  9.4 mg/dl


(8.5-10.1) 


 


 


Total Bilirubin


  0.6 mg/dl


(0.2-1) 


 


 


Direct Bilirubin


  < 0.1 mg/dl


(0-0.2) 


 


 


Aspartate Amino Transf


(AST/SGOT) 25 U/L (15-37) 


  


 


 


Alanine Aminotransferase


(ALT/SGPT) 82 U/L (12-78) 


  


 


 


Alkaline Phosphatase


  52 U/L


() 


 


 


Total Protein


  8.0 gm/dl


(6.4-8.2) 


 


 


Albumin


  4.2 gm/dl


(3.4-5.0) 


 


 


Thyroid Stimulating Hormone


(TSH) 1.010 uIu/ml


(0.300-4.500) 


 


 


Salicylates Level


  3.3 mg/dl


(2.8-20) 


 


 


Acetaminophen Level


  < 2 ug/ml


(10-30) 


 


 


Ethyl Alcohol mg/dL


  < 3.0 mg/dl


(0-3) 


 


 


Urine Color  YELLOW 


 


Urine Appearance  ERROR (CLEAR) 


 


Urine pH  5.5 (4.5-7.5) 


 


Urine Specific Gravity


  


  1.009


(1.000-1.030)


 


Urine Protein  NEG (NEG) 


 


Urine Glucose (UA)  NEG (NEG) 


 


Urine Ketones  NEG (NEG) 


 


Urine Occult Blood  1+ (NEG) 


 


Urine Nitrite  NEG (NEG) 


 


Urine Bilirubin  NEG (NEG) 


 


Urine Urobilinogen  NEG (NEG) 


 


Urine Leukocyte Esterase  NEG (NEG) 


 


Urine WBC (Auto)  0 /hpf (0-5) 


 


Urine RBC (Auto)  0-4 /hpf (0-4) 


 


Urine Hyaline Casts (Auto)  0 /lpf (0-5) 


 


Urine Epithelial Cells (Auto)  0-5 /lpf (0-5) 


 


Urine Bacteria (Auto)  NEG (NEG) 


 


Urine Opiates Screen  NEG (NEG) 


 


Urine Methadone, Qualitative  NEG (NEG) 


 


Urine Barbiturates  NEG (NEG) 


 


Urine Phencyclidine (PCP)


Level 


  NEG (NEG) 


 


 


Ur


Amphetamine/Methamphetamine 


  NEG (NEG) 


 


 


MDMA (Ecstasy) Screen  NEG (NEG) 


 


Urine Benzodiazepines Screen  NEG (NEG) 


 


Urine Cocaine Metabolite  NEG (NEG) 


 


Urine Marijuana (THC)  NEG (NEG) 





Laboratory results per my review.





ED Course


0517: Past medical records reviewed. The patient was evaluated in room A8. A 

complete history and physical exam was performed.  Labs were drawn as above.





0626: The patient is medically cleared.





0730: The patient will be signed out to Dr. Monteiro at change of shift.





Medical Decision


The patient is a 37 year old male who presents to the Emergency Room for a 

mental health evaluation. 





Differential diagnoses include mood disorder, thought disorder, insomnia, 

suicidal ideation. 





LABS:





Normal white count


Stable H&H


Normal TSH


Normal glucose


Normal LFTs


Normal renal function


Negative alcohol


Negative Tylenol


Salicylate level of 3.3


Urine Tox-screen is negative


Urine is positive for 1+ blood





This is a 37-year-old male patient who presents to the emergency department 

with significant insomnia and resulting hallucinations.  The patient does have 

a history of depression, anxiety and possible bipolar disorder.  He denies any 

suicidal ideation or substance abuse.  Quit drinking alcohol 2 weeks ago.  The 

patient has not been functioning at home.  He feels that he cannot even care 

for himself at times when he is not able to sleep.  He will be evaluated by 

staff from [facility].





Medication Reconcilliation


Current Medication List:  was personally reviewed by me





Blood Pressure Screening


Patient's blood pressure:  Elevated blood pressure


Blood pressure disposition:  Elevated BP felt to be situational





Impression





 Primary Impression:  


 Insomnia


 Additional Impression:  


 Depression





Scribe Attestation


The scribe's documentation has been prepared under my direction and personally 

reviewed by me in its entirety. I confirm that the note above accurately 

reflects all work, treatment, procedures, and medical decision making performed 

by me.





Departure Information


Dispostion


Still a Patient





Referrals


No Doctor, Assigned (PCP)





Patient Instructions


[facility]





Problem Qualifiers








 Primary Impression:  


 Insomnia


 Insomnia type:  unspecified  Qualified Codes:  G47.00 - Insomnia, unspecified


 Additional Impression:  


 Depression


 Depression Type:  major depressive disorder  Major depression recurrence:  

recurrent  Active/Remission status:  currently active  Major depression episode 

severity:  mild  Qualified Codes:  F33.0 - Major depressive disorder, recurrent

, mild

## 2018-04-15 ENCOUNTER — HOSPITAL ENCOUNTER (EMERGENCY)
Dept: HOSPITAL 45 - C.EDB | Age: 38
LOS: 1 days | Discharge: PSYCHIATRIC OR SUBSTANCE USE FACILITY | End: 2018-04-16
Payer: COMMERCIAL

## 2018-04-15 VITALS
WEIGHT: 246.26 LBS | WEIGHT: 246.26 LBS | BODY MASS INDEX: 39.11 KG/M2 | HEIGHT: 66.5 IN | HEIGHT: 66.5 IN | BODY MASS INDEX: 39.11 KG/M2

## 2018-04-15 VITALS — TEMPERATURE: 98.24 F

## 2018-04-15 DIAGNOSIS — Z88.8: ICD-10-CM

## 2018-04-15 DIAGNOSIS — R79.89: ICD-10-CM

## 2018-04-15 DIAGNOSIS — R45.851: Primary | ICD-10-CM

## 2018-04-15 DIAGNOSIS — R44.0: ICD-10-CM

## 2018-04-15 DIAGNOSIS — F32.9: ICD-10-CM

## 2018-04-15 DIAGNOSIS — R44.1: ICD-10-CM

## 2018-04-15 DIAGNOSIS — Q85.01: ICD-10-CM

## 2018-04-15 DIAGNOSIS — F17.200: ICD-10-CM

## 2018-04-15 DIAGNOSIS — Z88.6: ICD-10-CM
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
## 2018-04-16 VITALS — OXYGEN SATURATION: 97 % | DIASTOLIC BLOOD PRESSURE: 76 MMHG | HEART RATE: 71 BPM | SYSTOLIC BLOOD PRESSURE: 144 MMHG

## 2018-04-16 LAB
ALBUMIN SERPL-MCNC: 3.7 GM/DL (ref 3.4–5)
ALP SERPL-CCNC: 67 U/L (ref 45–117)
ALT SERPL-CCNC: 224 U/L (ref 12–78)
AST SERPL-CCNC: 58 U/L (ref 15–37)
BASOPHILS # BLD: 0.04 K/UL (ref 0–0.2)
BASOPHILS NFR BLD: 0.5 %
BUN SERPL-MCNC: 13 MG/DL (ref 7–18)
CALCIUM SERPL-MCNC: 8.9 MG/DL (ref 8.5–10.1)
CO2 SERPL-SCNC: 25 MMOL/L (ref 21–32)
CREAT SERPL-MCNC: 0.89 MG/DL (ref 0.6–1.4)
EOS ABS #: 0.48 K/UL (ref 0–0.5)
EOSINOPHIL NFR BLD AUTO: 208 K/UL (ref 130–400)
GLUCOSE SERPL-MCNC: 87 MG/DL (ref 70–99)
HCT VFR BLD CALC: 42.9 % (ref 42–52)
HGB BLD-MCNC: 15.2 G/DL (ref 14–18)
IG#: 0.02 K/UL (ref 0–0.02)
IMM GRANULOCYTES NFR BLD AUTO: 31.3 %
LYMPHOCYTES # BLD: 2.46 K/UL (ref 1.2–3.4)
MCH RBC QN AUTO: 29.7 PG (ref 25–34)
MCHC RBC AUTO-ENTMCNC: 35.4 G/DL (ref 32–36)
MCV RBC AUTO: 84 FL (ref 80–100)
MONO ABS #: 0.46 K/UL (ref 0.11–0.59)
MONOCYTES NFR BLD: 5.9 %
NEUT ABS #: 4.39 K/UL (ref 1.4–6.5)
NEUTROPHILS # BLD AUTO: 6.1 %
NEUTROPHILS NFR BLD AUTO: 55.9 %
PMV BLD AUTO: 10.9 FL (ref 7.4–10.4)
POTASSIUM SERPL-SCNC: 3.8 MMOL/L (ref 3.5–5.1)
PROT SERPL-MCNC: 7.1 GM/DL (ref 6.4–8.2)
RED CELL DISTRIBUTION WIDTH CV: 12.2 % (ref 11.5–14.5)
RED CELL DISTRIBUTION WIDTH SD: 37.3 FL (ref 36.4–46.3)
SODIUM SERPL-SCNC: 137 MMOL/L (ref 136–145)
WBC # BLD AUTO: 7.85 K/UL (ref 4.8–10.8)

## 2018-04-16 NOTE — EMERGENCY ROOM VISIT NOTE
ED Visit Note


First contact with patient:  07:14


I received this patient in signout at the change of shift from Dr. Kamara, 

pending mental health bed search.  Patient requested a nicotine patch which was 

ordered.  The patient was accepted at the [facility] and secure transportation 

arrangements were made.  Patient was transferred on a 201.